# Patient Record
Sex: MALE | Race: WHITE | NOT HISPANIC OR LATINO | Employment: FULL TIME | URBAN - METROPOLITAN AREA
[De-identification: names, ages, dates, MRNs, and addresses within clinical notes are randomized per-mention and may not be internally consistent; named-entity substitution may affect disease eponyms.]

---

## 2023-04-18 ENCOUNTER — TELEPHONE (OUTPATIENT)
Dept: PRIMARY CARE | Facility: CLINIC | Age: 48
End: 2023-04-18
Payer: COMMERCIAL

## 2023-04-18 DIAGNOSIS — K21.9 GASTROESOPHAGEAL REFLUX DISEASE, UNSPECIFIED WHETHER ESOPHAGITIS PRESENT: ICD-10-CM

## 2023-04-18 RX ORDER — OMEPRAZOLE 40 MG/1
40 CAPSULE, DELAYED RELEASE ORAL DAILY
Qty: 90 CAPSULE | Refills: 3 | Status: SHIPPED | OUTPATIENT
Start: 2023-04-18 | End: 2024-03-27 | Stop reason: SDUPTHER

## 2023-04-18 RX ORDER — OMEPRAZOLE 40 MG/1
40 CAPSULE, DELAYED RELEASE ORAL DAILY
COMMUNITY
End: 2023-04-18 | Stop reason: SDUPTHER

## 2023-04-18 NOTE — TELEPHONE ENCOUNTER
Refill:    Omeprazole 40mg 1 capsule daily    Pharmacy: [facility]    Last appointment: 2/21/2023  Future appointment: 7/24/2023

## 2023-07-21 LAB
ALANINE AMINOTRANSFERASE (SGPT) (U/L) IN SER/PLAS: 25 U/L (ref 10–52)
ALBUMIN (G/DL) IN SER/PLAS: 4 G/DL (ref 3.4–5)
ALKALINE PHOSPHATASE (U/L) IN SER/PLAS: 58 U/L (ref 33–120)
ANION GAP IN SER/PLAS: 9 MMOL/L (ref 10–20)
ASPARTATE AMINOTRANSFERASE (SGOT) (U/L) IN SER/PLAS: 22 U/L (ref 9–39)
BILIRUBIN TOTAL (MG/DL) IN SER/PLAS: 0.6 MG/DL (ref 0–1.2)
CALCIDIOL (25 OH VITAMIN D3) (NG/ML) IN SER/PLAS: 30 NG/ML
CALCIUM (MG/DL) IN SER/PLAS: 8.9 MG/DL (ref 8.6–10.3)
CARBON DIOXIDE, TOTAL (MMOL/L) IN SER/PLAS: 29 MMOL/L (ref 21–32)
CHLORIDE (MMOL/L) IN SER/PLAS: 106 MMOL/L (ref 98–107)
CHOLESTEROL (MG/DL) IN SER/PLAS: 203 MG/DL (ref 0–199)
CHOLESTEROL IN HDL (MG/DL) IN SER/PLAS: 42.5 MG/DL
CHOLESTEROL/HDL RATIO: 4.8
COBALAMIN (VITAMIN B12) (PG/ML) IN SER/PLAS: 402 PG/ML (ref 211–911)
CREATININE (MG/DL) IN SER/PLAS: 1.02 MG/DL (ref 0.5–1.3)
ERYTHROCYTE DISTRIBUTION WIDTH (RATIO) BY AUTOMATED COUNT: 12.8 % (ref 11.5–14.5)
ERYTHROCYTE MEAN CORPUSCULAR HEMOGLOBIN CONCENTRATION (G/DL) BY AUTOMATED: 33.4 G/DL (ref 32–36)
ERYTHROCYTE MEAN CORPUSCULAR VOLUME (FL) BY AUTOMATED COUNT: 92 FL (ref 80–100)
ERYTHROCYTES (10*6/UL) IN BLOOD BY AUTOMATED COUNT: 4.67 X10E12/L (ref 4.5–5.9)
GFR MALE: >90 ML/MIN/1.73M2
GLUCOSE (MG/DL) IN SER/PLAS: 88 MG/DL (ref 74–99)
HEMATOCRIT (%) IN BLOOD BY AUTOMATED COUNT: 42.8 % (ref 41–52)
HEMOGLOBIN (G/DL) IN BLOOD: 14.3 G/DL (ref 13.5–17.5)
KEPPRA: 20 UG/ML (ref 10–40)
LDL: 117 MG/DL (ref 0–99)
LEUKOCYTES (10*3/UL) IN BLOOD BY AUTOMATED COUNT: 5.7 X10E9/L (ref 4.4–11.3)
NON HDL CHOLESTEROL: 161 MG/DL
PLATELETS (10*3/UL) IN BLOOD AUTOMATED COUNT: 245 X10E9/L (ref 150–450)
POTASSIUM (MMOL/L) IN SER/PLAS: 4.4 MMOL/L (ref 3.5–5.3)
PROTEIN TOTAL: 6.8 G/DL (ref 6.4–8.2)
SODIUM (MMOL/L) IN SER/PLAS: 140 MMOL/L (ref 136–145)
TRIGLYCERIDE (MG/DL) IN SER/PLAS: 220 MG/DL (ref 0–149)
UREA NITROGEN (MG/DL) IN SER/PLAS: 15 MG/DL (ref 6–23)
VLDL: 44 MG/DL (ref 0–40)

## 2023-07-24 ENCOUNTER — OFFICE VISIT (OUTPATIENT)
Dept: PRIMARY CARE | Facility: CLINIC | Age: 48
End: 2023-07-24
Payer: COMMERCIAL

## 2023-07-24 VITALS
OXYGEN SATURATION: 95 % | HEIGHT: 67 IN | DIASTOLIC BLOOD PRESSURE: 72 MMHG | SYSTOLIC BLOOD PRESSURE: 107 MMHG | WEIGHT: 177 LBS | HEART RATE: 72 BPM | RESPIRATION RATE: 14 BRPM | BODY MASS INDEX: 27.78 KG/M2 | TEMPERATURE: 98.5 F

## 2023-07-24 DIAGNOSIS — K21.9 GASTROESOPHAGEAL REFLUX DISEASE, UNSPECIFIED WHETHER ESOPHAGITIS PRESENT: ICD-10-CM

## 2023-07-24 DIAGNOSIS — G40.801 OTHER EPILEPSY WITH STATUS EPILEPTICUS, NOT INTRACTABLE (MULTI): Primary | ICD-10-CM

## 2023-07-24 DIAGNOSIS — E55.9 VITAMIN D DEFICIENCY: ICD-10-CM

## 2023-07-24 PROBLEM — R55 NEAR SYNCOPE: Status: ACTIVE | Noted: 2023-07-24

## 2023-07-24 PROBLEM — H57.9 EYE PROBLEM: Status: RESOLVED | Noted: 2023-07-24 | Resolved: 2023-07-24

## 2023-07-24 PROBLEM — E78.1 ESSENTIAL HYPERTRIGLYCERIDEMIA: Status: ACTIVE | Noted: 2023-07-24

## 2023-07-24 PROBLEM — G47.33 OBSTRUCTIVE SLEEP APNEA: Status: ACTIVE | Noted: 2023-07-24

## 2023-07-24 PROBLEM — G40.909 EPILEPSY (MULTI): Status: ACTIVE | Noted: 2023-07-24

## 2023-07-24 PROBLEM — G47.10 HYPERSOMNIA: Status: ACTIVE | Noted: 2023-07-24

## 2023-07-24 PROBLEM — S69.91XA INJURY OF RIGHT WRIST: Status: ACTIVE | Noted: 2023-07-24

## 2023-07-24 PROBLEM — L70.9 ADULT ACNE: Status: ACTIVE | Noted: 2023-07-24

## 2023-07-24 PROBLEM — R45.89 DEPRESSED MOOD: Status: ACTIVE | Noted: 2023-07-24

## 2023-07-24 PROBLEM — H57.9 EYE PROBLEM: Status: ACTIVE | Noted: 2023-07-24

## 2023-07-24 PROBLEM — R42 LIGHTHEADEDNESS: Status: ACTIVE | Noted: 2023-07-24

## 2023-07-24 PROBLEM — S69.91XA INJURY OF RIGHT WRIST: Status: RESOLVED | Noted: 2023-07-24 | Resolved: 2023-07-24

## 2023-07-24 PROCEDURE — 99213 OFFICE O/P EST LOW 20 MIN: CPT | Performed by: FAMILY MEDICINE

## 2023-07-24 PROCEDURE — 1036F TOBACCO NON-USER: CPT | Performed by: FAMILY MEDICINE

## 2023-07-24 RX ORDER — CLINDAMYCIN PHOSPHATE 10 UG/ML
LOTION TOPICAL
COMMUNITY
End: 2024-01-02

## 2023-07-24 RX ORDER — MULTIVITAMIN,THER AND MINERALS
1 CAPSULE ORAL DAILY
COMMUNITY
Start: 2022-09-19

## 2023-07-24 RX ORDER — LACOSAMIDE 100 MG/1
100 TABLET ORAL 2 TIMES DAILY
COMMUNITY
End: 2024-01-17 | Stop reason: SDUPTHER

## 2023-07-24 RX ORDER — LEVETIRACETAM 1000 MG/1
1000 TABLET ORAL 2 TIMES DAILY
COMMUNITY
End: 2024-01-17 | Stop reason: SDUPTHER

## 2023-07-24 RX ORDER — BENZOYL PEROXIDE 50 MG/ML
LIQUID TOPICAL
COMMUNITY
Start: 2022-08-14

## 2023-07-24 RX ORDER — LEVETIRACETAM 500 MG/1
250 TABLET ORAL DAILY
COMMUNITY
End: 2024-01-17 | Stop reason: SDUPTHER

## 2023-07-24 RX ORDER — CHOLECALCIFEROL (VITAMIN D3) 25 MCG
2000 TABLET ORAL DAILY
COMMUNITY
Start: 2021-04-20

## 2023-07-24 ASSESSMENT — ENCOUNTER SYMPTOMS
CHILLS: 0
CHEST TIGHTNESS: 0
ARTHRALGIAS: 0
PALPITATIONS: 0
FEVER: 0
ABDOMINAL PAIN: 0
CONFUSION: 0
SHORTNESS OF BREATH: 0

## 2023-07-24 NOTE — PROGRESS NOTES
"Subjective   Patient ID: Lv Cuello is a 47 y.o. male who presents for Follow-up (5 month/).    HPI patient today for follow-up overall doing okay.  Denies any obvious seizure activity since last appointment he continues with medication and follows with neurology.  He does note some early morning fatigue when getting up stating that he is somewhat sluggish till a day get started and he seems to be better throughout the rest of the day.  Review of Systems   Constitutional:  Negative for chills and fever.   HENT:  Negative for congestion and ear pain.    Eyes:  Negative for visual disturbance.   Respiratory:  Negative for chest tightness and shortness of breath.    Cardiovascular:  Negative for chest pain and palpitations.   Gastrointestinal:  Negative for abdominal pain.   Musculoskeletal:  Negative for arthralgias.   Skin:  Negative for pallor.   Psychiatric/Behavioral:  Negative for confusion.        Objective   /72   Pulse 72   Temp 36.9 °C (98.5 °F)   Resp 14   Ht 1.702 m (5' 7\")   Wt 80.3 kg (177 lb)   SpO2 95%   BMI 27.72 kg/m²     Physical Exam  Vitals and nursing note reviewed.   Constitutional:       General: He is not in acute distress.     Appearance: Normal appearance. He is not ill-appearing.   HENT:      Head: Normocephalic and atraumatic.      Right Ear: Tympanic membrane, ear canal and external ear normal.      Left Ear: Tympanic membrane, ear canal and external ear normal.      Mouth/Throat:      Pharynx: Oropharynx is clear.   Eyes:      Extraocular Movements: Extraocular movements intact.   Cardiovascular:      Rate and Rhythm: Normal rate and regular rhythm.      Pulses: Normal pulses.      Heart sounds: Normal heart sounds.   Pulmonary:      Effort: Pulmonary effort is normal.      Breath sounds: Normal breath sounds.   Abdominal:      General: Abdomen is flat. Bowel sounds are normal.      Palpations: Abdomen is soft.      Tenderness: There is no abdominal tenderness. "   Musculoskeletal:         General: Normal range of motion.      Cervical back: Neck supple.   Skin:     General: Skin is warm.   Neurological:      Mental Status: He is alert and oriented to person, place, and time. Mental status is at baseline.   Psychiatric:         Mood and Affect: Mood normal.       Recent Results (from the past 1008 hour(s))   Comprehensive Metabolic Panel    Collection Time: 07/21/23 10:43 AM   Result Value Ref Range    Glucose 88 74 - 99 mg/dL    Sodium 140 136 - 145 mmol/L    Potassium 4.4 3.5 - 5.3 mmol/L    Chloride 106 98 - 107 mmol/L    Bicarbonate 29 21 - 32 mmol/L    Anion Gap 9 (L) 10 - 20 mmol/L    Urea Nitrogen 15 6 - 23 mg/dL    Creatinine 1.02 0.50 - 1.30 mg/dL    GFR MALE >90 >90 mL/min/1.73m2    Calcium 8.9 8.6 - 10.3 mg/dL    Albumin 4.0 3.4 - 5.0 g/dL    Alkaline Phosphatase 58 33 - 120 U/L    Total Protein 6.8 6.4 - 8.2 g/dL    AST 22 9 - 39 U/L    Total Bilirubin 0.6 0.0 - 1.2 mg/dL    ALT (SGPT) 25 10 - 52 U/L   Levetiracetam    Collection Time: 07/21/23 10:43 AM   Result Value Ref Range    KEPPRA 20 10 - 40 ug/mL   Vitamin B12    Collection Time: 07/21/23 10:43 AM   Result Value Ref Range    Vitamin B-12 402 211 - 911 pg/mL   Lipid Panel    Collection Time: 07/21/23 10:43 AM   Result Value Ref Range    Cholesterol 203 (H) 0 - 199 mg/dL    HDL 42.5 mg/dL    Cholesterol/HDL Ratio 4.8      (H) 0 - 99 mg/dL    VLDL 44 (H) 0 - 40 mg/dL    Triglycerides 220 (H) 0 - 149 mg/dL    Non HDL Cholesterol 161 mg/dL   Vitamin D, Total    Collection Time: 07/21/23 10:43 AM   Result Value Ref Range    Vitamin D, 25-Hydroxy 30 ng/mL   CBC    Collection Time: 07/21/23 10:43 AM   Result Value Ref Range    WBC 5.7 4.4 - 11.3 x10E9/L    RBC 4.67 4.50 - 5.90 x10E12/L    Hemoglobin 14.3 13.5 - 17.5 g/dL    Hematocrit 42.8 41.0 - 52.0 %    MCV 92 80 - 100 fL    MCHC 33.4 32.0 - 36.0 g/dL    Platelets 245 150 - 450 x10E9/L    RDW 12.8 11.5 - 14.5 %     Recent labs reviewed with  patient  Continue current medication follow with neurology  Follow low-fat low-cholesterol diet  Return in December of this year with repeat fasting labs    Assessment/Plan   Problem List Items Addressed This Visit       GERD (gastroesophageal reflux disease)     Continue omeprazole dietary modification         Epilepsy (CMS/HCC) - Primary     Currently stable on medication he continues to follow with neurology         Vitamin D deficiency     Continue to monitor and supplement as needed

## 2023-11-02 ENCOUNTER — TELEPHONE (OUTPATIENT)
Dept: PRIMARY CARE | Facility: CLINIC | Age: 48
End: 2023-11-02
Payer: COMMERCIAL

## 2023-12-18 ENCOUNTER — LAB (OUTPATIENT)
Dept: LAB | Facility: LAB | Age: 48
End: 2023-12-18
Payer: COMMERCIAL

## 2023-12-18 DIAGNOSIS — G40.802 OTHER EPILEPSY WITHOUT STATUS EPILEPTICUS, NOT INTRACTABLE (MULTI): ICD-10-CM

## 2023-12-18 DIAGNOSIS — E78.49 OTHER HYPERLIPIDEMIA: ICD-10-CM

## 2023-12-18 DIAGNOSIS — E78.1 ESSENTIAL HYPERTRIGLYCERIDEMIA: Primary | ICD-10-CM

## 2023-12-18 DIAGNOSIS — E78.1 ESSENTIAL HYPERTRIGLYCERIDEMIA: ICD-10-CM

## 2023-12-18 LAB
ALBUMIN SERPL BCP-MCNC: 4 G/DL (ref 3.4–5)
ALP SERPL-CCNC: 80 U/L (ref 33–120)
ALT SERPL W P-5'-P-CCNC: 31 U/L (ref 10–52)
ANION GAP SERPL CALC-SCNC: 11 MMOL/L (ref 10–20)
AST SERPL W P-5'-P-CCNC: 24 U/L (ref 9–39)
BILIRUB SERPL-MCNC: 0.5 MG/DL (ref 0–1.2)
BUN SERPL-MCNC: 11 MG/DL (ref 6–23)
CALCIUM SERPL-MCNC: 8.8 MG/DL (ref 8.6–10.3)
CHLORIDE SERPL-SCNC: 106 MMOL/L (ref 98–107)
CHOLEST SERPL-MCNC: 189 MG/DL (ref 0–199)
CHOLESTEROL/HDL RATIO: 4.8
CO2 SERPL-SCNC: 27 MMOL/L (ref 21–32)
CREAT SERPL-MCNC: 0.99 MG/DL (ref 0.5–1.3)
ERYTHROCYTE [DISTWIDTH] IN BLOOD BY AUTOMATED COUNT: 12.5 % (ref 11.5–14.5)
GFR SERPL CREATININE-BSD FRML MDRD: >90 ML/MIN/1.73M*2
GLUCOSE SERPL-MCNC: 94 MG/DL (ref 74–99)
HCT VFR BLD AUTO: 43.2 % (ref 41–52)
HDLC SERPL-MCNC: 39.6 MG/DL
HGB BLD-MCNC: 14.5 G/DL (ref 13.5–17.5)
LDLC SERPL CALC-MCNC: 115 MG/DL
MCH RBC QN AUTO: 30.3 PG (ref 26–34)
MCHC RBC AUTO-ENTMCNC: 33.6 G/DL (ref 32–36)
MCV RBC AUTO: 90 FL (ref 80–100)
NON HDL CHOLESTEROL: 149 MG/DL (ref 0–149)
NRBC BLD-RTO: 0 /100 WBCS (ref 0–0)
PLATELET # BLD AUTO: 248 X10*3/UL (ref 150–450)
POTASSIUM SERPL-SCNC: 4.3 MMOL/L (ref 3.5–5.3)
PROT SERPL-MCNC: 6.8 G/DL (ref 6.4–8.2)
RBC # BLD AUTO: 4.79 X10*6/UL (ref 4.5–5.9)
SODIUM SERPL-SCNC: 140 MMOL/L (ref 136–145)
TRIGL SERPL-MCNC: 174 MG/DL (ref 0–149)
VLDL: 35 MG/DL (ref 0–40)
WBC # BLD AUTO: 7.8 X10*3/UL (ref 4.4–11.3)

## 2023-12-18 PROCEDURE — 80177 DRUG SCRN QUAN LEVETIRACETAM: CPT

## 2023-12-18 PROCEDURE — 85027 COMPLETE CBC AUTOMATED: CPT

## 2023-12-18 PROCEDURE — 80053 COMPREHEN METABOLIC PANEL: CPT

## 2023-12-18 PROCEDURE — 83721 ASSAY OF BLOOD LIPOPROTEIN: CPT

## 2023-12-18 PROCEDURE — 80061 LIPID PANEL: CPT

## 2023-12-18 PROCEDURE — 36415 COLL VENOUS BLD VENIPUNCTURE: CPT

## 2023-12-19 ENCOUNTER — OFFICE VISIT (OUTPATIENT)
Dept: PRIMARY CARE | Facility: CLINIC | Age: 48
End: 2023-12-19
Payer: COMMERCIAL

## 2023-12-19 VITALS
TEMPERATURE: 97.8 F | OXYGEN SATURATION: 96 % | SYSTOLIC BLOOD PRESSURE: 107 MMHG | WEIGHT: 177.8 LBS | HEART RATE: 77 BPM | BODY MASS INDEX: 27.91 KG/M2 | DIASTOLIC BLOOD PRESSURE: 70 MMHG | HEIGHT: 67 IN

## 2023-12-19 DIAGNOSIS — G47.10 HYPERSOMNIA: ICD-10-CM

## 2023-12-19 DIAGNOSIS — Z13.29 THYROID DISORDER SCREEN: ICD-10-CM

## 2023-12-19 DIAGNOSIS — E55.9 VITAMIN D DEFICIENCY: ICD-10-CM

## 2023-12-19 DIAGNOSIS — E78.1 ESSENTIAL HYPERTRIGLYCERIDEMIA: ICD-10-CM

## 2023-12-19 DIAGNOSIS — E78.49 OTHER HYPERLIPIDEMIA: ICD-10-CM

## 2023-12-19 DIAGNOSIS — K21.9 GASTROESOPHAGEAL REFLUX DISEASE, UNSPECIFIED WHETHER ESOPHAGITIS PRESENT: ICD-10-CM

## 2023-12-19 DIAGNOSIS — G40.409 OTHER GENERALIZED EPILEPSY, NOT INTRACTABLE, WITHOUT STATUS EPILEPTICUS (MULTI): Primary | ICD-10-CM

## 2023-12-19 LAB
LDLC SERPL DIRECT ASSAY-MCNC: 125 MG/DL (ref 0–129)
LEVETIRACETAM SERPL-MCNC: 20 UG/ML (ref 10–40)

## 2023-12-19 PROCEDURE — 99214 OFFICE O/P EST MOD 30 MIN: CPT | Performed by: FAMILY MEDICINE

## 2023-12-19 PROCEDURE — 1036F TOBACCO NON-USER: CPT | Performed by: FAMILY MEDICINE

## 2023-12-19 ASSESSMENT — ENCOUNTER SYMPTOMS
CHILLS: 0
SHORTNESS OF BREATH: 0
PALPITATIONS: 0
FEVER: 0
CHEST TIGHTNESS: 0
CONFUSION: 0
ABDOMINAL PAIN: 0
DEPRESSION: 0
ARTHRALGIAS: 0

## 2023-12-19 NOTE — ASSESSMENT & PLAN NOTE
Reviewed better sleep hygiene.  He is can try taking over-the-counter melatonin.  We discussed doing a sleep study for now he wants to think about it see how the next month goes if he changes his mind he will call the office for an order.

## 2023-12-19 NOTE — PROGRESS NOTES
"Subjective   Patient ID: Lv Cuello is a 48 y.o. male who presents for Follow-up (5 months follow up. Patient states feeling under the weather for the past week, he feels stuffiness, headaches, and phlegm. ).    HPI patient today for follow-up.  For the most part states he is doing okay he did not denies any known recent seizure activity.  He does state that he does not always feel rested and ready to go first thing in the morning.  He is not sure if it is because he is not getting a good night sleep or not.  He denies really feeling depressed.    Review of Systems   Constitutional:  Negative for chills and fever.   HENT:  Negative for congestion and ear pain.    Eyes:  Negative for visual disturbance.   Respiratory:  Negative for chest tightness and shortness of breath.    Cardiovascular:  Negative for chest pain and palpitations.   Gastrointestinal:  Negative for abdominal pain.   Musculoskeletal:  Negative for arthralgias.   Skin:  Negative for pallor.   Psychiatric/Behavioral:  Negative for confusion.        Objective   /70 (BP Location: Left arm, Patient Position: Sitting, BP Cuff Size: Adult long)   Pulse 77   Temp 36.6 °C (97.8 °F) (Temporal)   Ht 1.702 m (5' 7\")   Wt 80.6 kg (177 lb 12.8 oz)   SpO2 96%   BMI 27.85 kg/m²     Physical Exam  Vitals and nursing note reviewed.   Constitutional:       General: He is not in acute distress.     Appearance: Normal appearance. He is not ill-appearing.   HENT:      Head: Normocephalic and atraumatic.      Right Ear: Tympanic membrane, ear canal and external ear normal.      Left Ear: Tympanic membrane, ear canal and external ear normal.      Mouth/Throat:      Pharynx: Oropharynx is clear.   Eyes:      Extraocular Movements: Extraocular movements intact.   Cardiovascular:      Rate and Rhythm: Normal rate and regular rhythm.      Pulses: Normal pulses.      Heart sounds: Normal heart sounds.   Pulmonary:      Effort: Pulmonary effort is normal.      " Breath sounds: Normal breath sounds.   Abdominal:      General: Abdomen is flat. Bowel sounds are normal.      Palpations: Abdomen is soft.      Tenderness: There is no abdominal tenderness.   Musculoskeletal:         General: Normal range of motion.      Cervical back: Neck supple.   Skin:     General: Skin is warm.   Neurological:      Mental Status: He is alert and oriented to person, place, and time. Mental status is at baseline.   Psychiatric:         Mood and Affect: Mood normal.       Recent Results (from the past 1008 hour(s))   CBC    Collection Time: 12/18/23 11:59 AM   Result Value Ref Range    WBC 7.8 4.4 - 11.3 x10*3/uL    nRBC 0.0 0.0 - 0.0 /100 WBCs    RBC 4.79 4.50 - 5.90 x10*6/uL    Hemoglobin 14.5 13.5 - 17.5 g/dL    Hematocrit 43.2 41.0 - 52.0 %    MCV 90 80 - 100 fL    MCH 30.3 26.0 - 34.0 pg    MCHC 33.6 32.0 - 36.0 g/dL    RDW 12.5 11.5 - 14.5 %    Platelets 248 150 - 450 x10*3/uL   Cholesterol, LDL Direct    Collection Time: 12/18/23 11:59 AM   Result Value Ref Range    LDL, Direct 125 0 - 129 mg/dL   Comprehensive Metabolic Panel    Collection Time: 12/18/23 11:59 AM   Result Value Ref Range    Glucose 94 74 - 99 mg/dL    Sodium 140 136 - 145 mmol/L    Potassium 4.3 3.5 - 5.3 mmol/L    Chloride 106 98 - 107 mmol/L    Bicarbonate 27 21 - 32 mmol/L    Anion Gap 11 10 - 20 mmol/L    Urea Nitrogen 11 6 - 23 mg/dL    Creatinine 0.99 0.50 - 1.30 mg/dL    eGFR >90 >60 mL/min/1.73m*2    Calcium 8.8 8.6 - 10.3 mg/dL    Albumin 4.0 3.4 - 5.0 g/dL    Alkaline Phosphatase 80 33 - 120 U/L    Total Protein 6.8 6.4 - 8.2 g/dL    AST 24 9 - 39 U/L    Bilirubin, Total 0.5 0.0 - 1.2 mg/dL    ALT 31 10 - 52 U/L   Lipid Panel    Collection Time: 12/18/23 11:59 AM   Result Value Ref Range    Cholesterol 189 0 - 199 mg/dL    HDL-Cholesterol 39.6 mg/dL    Cholesterol/HDL Ratio 4.8     LDL Calculated 115 (H) <=99 mg/dL    VLDL 35 0 - 40 mg/dL    Triglycerides 174 (H) 0 - 149 mg/dL    Non HDL Cholesterol 149 0 - 149  mg/dL   Levetiracetam    Collection Time: 12/18/23 11:59 AM   Result Value Ref Range    Keppra 20 10 - 40 ug/mL     Recent labs reviewed with patient overall numbers are stable and have improved continue to work on lifestyle modifications.    He refuses flu vaccine    Regarding his morning fatigue we discussed sleep study he is declined if he changes his mind he will notify the office.  In the meantime he can work on better sleep hygiene practices and also may try taking some melatonin.    He is return to our office in 5 months with repeat fasting lab    Assessment/Plan   Problem List Items Addressed This Visit             ICD-10-CM    GERD (gastroesophageal reflux disease) K21.9     Stable continue omeprazole 40 mg daily         Relevant Orders    CBC    Comprehensive Metabolic Panel    Follow Up In Primary Care - Established    Epilepsy (CMS/[facility]) - Primary G40.909     Clinically stable continue current medications continue to follow with neurology.  Keppra level is in therapeutic range         Relevant Orders    CBC    Comprehensive Metabolic Panel    Levetiracetam    Essential hypertriglyceridemia E78.1     Triglycerides have improved continue to work on lifestyle modifications         Relevant Orders    Comprehensive Metabolic Panel    Lipid Panel    Follow Up In Primary Care - Established    Hypersomnia G47.10     Reviewed better sleep hygiene.  He is can try taking over-the-counter melatonin.  We discussed doing a sleep study for now he wants to think about it see how the next month goes if he changes his mind he will call the office for an order.         Vitamin D deficiency E55.9     Continue to monitor supplement as needed         Relevant Orders    Comprehensive Metabolic Panel    Vitamin D 25-Hydroxy,Total (for eval of Vitamin D levels)    Follow Up In Primary Care - Established    Thyroid disorder screen Z13.29     TSH with reflex         Relevant Orders    TSH with reflex to Free T4 if abnormal      Other Visit Diagnoses         Codes    Other hyperlipidemia     E78.49    Relevant Orders    Cholesterol, LDL Direct

## 2023-12-19 NOTE — ASSESSMENT & PLAN NOTE
Clinically stable continue current medications continue to follow with neurology.  Keppra level is in therapeutic range

## 2023-12-27 DIAGNOSIS — L73.9 FOLLICULITIS: Primary | ICD-10-CM

## 2024-01-02 RX ORDER — CLINDAMYCIN PHOSPHATE 10 UG/ML
LOTION TOPICAL
Qty: 60 ML | Refills: 11 | Status: SHIPPED | OUTPATIENT
Start: 2024-01-02

## 2024-01-16 ENCOUNTER — TELEMEDICINE (OUTPATIENT)
Dept: NEUROLOGY | Facility: CLINIC | Age: 49
End: 2024-01-16
Payer: COMMERCIAL

## 2024-01-16 DIAGNOSIS — R56.9 SEIZURE (MULTI): Primary | ICD-10-CM

## 2024-01-16 PROCEDURE — 99214 OFFICE O/P EST MOD 30 MIN: CPT | Mod: GT,95 | Performed by: NURSE PRACTITIONER

## 2024-01-16 PROCEDURE — 99214 OFFICE O/P EST MOD 30 MIN: CPT | Performed by: NURSE PRACTITIONER

## 2024-01-16 NOTE — LETTER
January 16, 2024     Patient: Lv Cuello   YOB: 1975   Date of Visit: 1/16/2024       To Whom It May Concern:    It is my medical opinion that Lv Cuello may return to full duty immediately with no restrictions.    If you have any questions or concerns, please don't hesitate to call.         Sincerely,    Ritika Parker, APRN-CNP    CC: No Recipients

## 2024-01-17 RX ORDER — LACOSAMIDE 100 MG/1
100 TABLET ORAL 2 TIMES DAILY
Qty: 60 TABLET | Refills: 5 | Status: SHIPPED | OUTPATIENT
Start: 2024-01-17 | End: 2024-06-04

## 2024-01-17 RX ORDER — LEVETIRACETAM 500 MG/1
250 TABLET ORAL DAILY
Qty: 15 TABLET | Refills: 11 | Status: SHIPPED | OUTPATIENT
Start: 2024-01-17 | End: 2025-01-16

## 2024-01-17 RX ORDER — LEVETIRACETAM 1000 MG/1
1000 TABLET ORAL 2 TIMES DAILY
Qty: 60 TABLET | Refills: 11 | Status: SHIPPED | OUTPATIENT
Start: 2024-01-17 | End: 2025-01-16

## 2024-01-17 NOTE — PROGRESS NOTES
Virtual or Telephone Consent    A telephone visit (audio only) between the patient (at the originating site) and the provider (at the distant site) was utilized to provide this telehealth service.   Verbal consent was requested and obtained from Lv Cuello on this date, 01/17/24 for a telehealth visit.      Patient ID: Lv Cuello 48 y.o.male presenting in follow-up for epilepsy.     HPI  --------------- Classification ---------------  EPILEPTIC Paroxysmal Episodes  Semiology:  1. Dialeptic* > Generalized clonic seizure**  - Onset: September 2019 (or as early as 6/2019)  - Frequency: 2x in lifetime during day, every few months at night  - Last seizure: Mid-4/2022 (suspected; woke up sore, confused)  EZ: Unknown  Etiology: Unknown  Comorbidities: N/A     Prior AEDs: N/A  Current AEDs (last level): LEV 1651-7480, -100    SEIZURE HISTORY:  The patient had a history of seizures since childhood. At age 1-1yo, the patient had some seizures of unknown type. Reports that he had been told by his mother that he had seizures as a child, but that no treatment was needed. Patient denies any childhood medications, no recollection of seizure disorder.     The patient's first adult episode occurred around June 2019. Per report of his girlfriend and EMR, the patient had woken up and was getting ready for work in the bathroom. She heard a noise (as if he'd fallen), but didn't pay much attention to it. When he emerged from the bathroom, she attempted to speak to him but he was not acting himself. He was breathing funny, but still seemed a little out of it. He soon fell asleep afterwards, and slept for roughly 30 minutes. On awakening, he was back to himself, though had no recollection of the event. However, he did complain that his tongue hurt. Since he was responsive, they did not pursue medical assessment.     However, he had another event on 9/19/2019. He had been in his normal self, got up in the morning to go  to work. Per report of girlfriend, he had gone to the bathroom, and she heard a yell. She turned around and ran to him, and saw all of his muscles were very tight and he was flush. He then started having generalized convulsions, and had purple lips. This lasted a few minutes. He was drowsy and out of it for another 30 minutes. Denies incontinence. Did have tongue and cheek biting (tip of tongue and sides). EMS was called, and he was described to be post-ictal on arrival, drowsy and out of it. Upon presentation to [facility] ED, however, he was back to baseline. Exam was unremarkable, though labs were significant for elevated prolactin (32.17). CT head was unremarkable     He has also complained of some headaches, which he describes as mild and bitemporal pressure that comes and goes, and is generally responsive to over the counter naproxen. This is usually accompanied by lightheadedness, and at times a vertiginous feeling. He continues to fatigue fairly quickly, and needs to take a nap.     9/30/2019: Initial presentation as outlined above. Started on levetiracetam based on clinical concern for epilepsy. Routine EEG and MRI brain ordered.      11/7/2019: MRI brain w/wo gadolinium (Epilepsy protocol) was normal.     11/25/2019: Routine EEG was normal. No epileptiform discharges or lateralizing signs.     12/3/2019: Called  Epilepsy office for breakthrough seizure. He was not aware of the event (though remembers sitting up, but nothing afterwards), but his girlfriend told him he'd been sleeping and had roughly 20-30 seconds of generalized shaking. Denied incontinence or tongue biting. Patient himself stated he'd been feeling well, though has been having restless sleep due to the stressors in his life (working on scheduling appointment with Psych in [place]), and had missed a single dose of his levetiracetam a few days prior to the event, though had otherwise been fully adherent. The event itself was self-limited, though he  was tired and slept a good portion into the day. Levetiracetam was increased to 750mg BID, and he was instructed to continue to f/u with Psych.      1/21/2020: The patient has been overall doing very well. He states he has been seizure free, and has been tolerating the increased dose of levetiracetam, even better than the lower dose. -750 continued.     5/18/20: Doing well, no issues. Adherent to -750 without side effects. Not driving. Noted improvement in LEV. Did have cardiology workup noting mild PFO. Continued on -750.      6/29/2020: Called with breakthrough seizure in settin of poor sleep. Had GTC out of sleep. LEV level 29. Increased LEV to 4552-8103.     11/5/2020: Called to report irritability, favored due to LEV. Discussed trialing pyridoxine 100mg daily.     11/18/2021: Called to report breakthrough seizure, again out of sleep. +TB, incontinence. LEV increased to 4406-6897.      5/2/2022: Continues to have seizures, every 1-2 months. Exclusively out of sleep, early in morning. Fiance notes grunting, spitting, version (usually to left), stiff, then convulsing. Seizures lasting 5 minutes. Also has smaller seizure. Self reduced LEV 9957-5540 to 7416-2248. Started -100, referred to [facility].       PRESENT CONCERNS:    Patient states he's been doing well, with no further seizures since mid-April 2022 (woke up sore and confused) . and his fiancee has denied any seizure-like episodes at night as well.   He has concern for seizure in fall of 2023 because he woke up feeling a bit off balance- this can be typical after a seizure but he didn't have other associated symptoms like he normally does - I.e tongue bit or loss of bladder control -- also wife did not wake up and she typically always wakes during seizure.   He has taking and tolerating the medications well and without issue. He has no additional concerns.     Review of Systems   All other systems reviewed and are  negative.        CONTROLLED SUBSTANCE  OARRS:  No data recorded  I have personally reviewed the OARRS report for Rafiq Montana. I have considered the risks of abuse, dependence, addiction and diversion and I believe that it is clinically appropriate for Rafiq Montana to be prescribed this medication    Is the patient prescribed a combination of a benzodiazepine and opioid?  No    Last Urine Drug Screen / ordered today: No  No results found for this or any previous visit (from the past 8760 hour(s)).    Clinical rationale for not completing a Urine Drug Screen: Patient prescribed controlled substance for management of seizure, epilepsy, movement disorder              RESULTS:  EEG:  No EEG results found for the past 12 months    EKG:  No results found for this or any previous visit (from the past 4464 hour(s)).    LABS:      IMAGING:  No MRI head results found for the past 12 months    No CT head results found for the past 12 months    Results for orders placed or performed in visit on 11/25/19   EEG    Narrative    Ordered by an unspecified provider.   Results for orders placed or performed in visit on 11/07/19   MR BRAIN W AND WO IV CONTRAST    Narrative    MRN: [id number]  Patient Name: RAFIQ MONTANA     STUDY:  MRI BRAIN W/WO CONTRAST; 11/7/2019 10:25 am     INDICATION:  Epilepsy, unspecified, not intractable, without status epilepticus.     COMPARISON:  09/19/2019 head CT.     ACCESSION NUMBER(S):  [id number]     ORDERING CLINICIAN:  SINCERE ABAD     TECHNIQUE:  Axial T2, FLAIR, DWI, gradient echo T2 and sagittal and coronal T1  weighted images of brain were acquired. Post contrast T1 weighted  images were acquired after administration of  gadolinium based  intravenous contrast.     FINDINGS:  No heterotopic gray matter was identified. No definite asymmetry or  other abnormality of the mesial temporal lobes within the limits of  this exam without dedicated thin section imaging through the  mesial  temporal regions. No acute intracranial hemorrhage, mass mass effect  or midline shift. No pathologic parenchymal enhancement. No  hydrocephalus. Minimal nonspecific periventricular T2 FLAIR  hyperintensities may reflect subtle chronic small vessel ischemic  changes given the age of the patient. The major intracranial flow  voids are patent. Minimal mucoperiosteal thickening throughout the  ethmoid sinuses and maxillary sinuses noted. Anatomic variant metopic  suture. Mastoid air cells are clear.     IMPRESSION:  No intracranial abnormality was identified on this examination to  explain the clinical complaint of seizure.       Vitals:  There were no vitals filed for this visit.    PHYSICAL EXAM:  Neurologic Exam       ASSESSMENT & PLAN:   48 y.o. male presenting in follow-up for peviously diagnosed epilepsy. Semiology as described above    Problem List Items Addressed This Visit    None       48 year old man presenting in follow-up of his previously diagnosed epilepsy. His last known seizure was mid-April 2022 (suspected, woke up sore and confused). Discussed concern for possible seizure in fall of 2023 but it did not have his typical features and wife was not awoken. Will not increase meds at this time- if another event were to occur please let the office know. Doing well since initiation of -100, taking and tolerating without issue. Continues LEV 7178-1073 as well. Does complain of waking not feeling refreshed and feeling groggy.      - Continue LEV 3824-5288, -100  - Seizure precautions, SUDEP risk, and medication side effects were explained in detail (see pt summary)  -reccomned sleep study for AURELIANO   -would like to pursue EMU to localize his epilepsy - he will discuss with wife and let me know   - Discussed need to call me with breakthrough events, or go to ED with any acute/concerning symptoms  - Will see back in clinic in 6 months; provided number to call with questions     No  restrictions at this time

## 2024-02-04 DIAGNOSIS — K21.9 GASTROESOPHAGEAL REFLUX DISEASE, UNSPECIFIED WHETHER ESOPHAGITIS PRESENT: ICD-10-CM

## 2024-03-22 DIAGNOSIS — K21.9 GASTROESOPHAGEAL REFLUX DISEASE, UNSPECIFIED WHETHER ESOPHAGITIS PRESENT: ICD-10-CM

## 2024-03-22 RX ORDER — OMEPRAZOLE 40 MG/1
40 CAPSULE, DELAYED RELEASE ORAL DAILY
Qty: 90 CAPSULE | Refills: 3 | OUTPATIENT
Start: 2024-03-22

## 2024-03-27 ENCOUNTER — TELEPHONE (OUTPATIENT)
Dept: PRIMARY CARE | Facility: CLINIC | Age: 49
End: 2024-03-27
Payer: COMMERCIAL

## 2024-03-27 DIAGNOSIS — K21.9 GASTROESOPHAGEAL REFLUX DISEASE, UNSPECIFIED WHETHER ESOPHAGITIS PRESENT: ICD-10-CM

## 2024-03-27 RX ORDER — OMEPRAZOLE 40 MG/1
40 CAPSULE, DELAYED RELEASE ORAL DAILY
Qty: 90 CAPSULE | Refills: 3 | Status: SHIPPED | OUTPATIENT
Start: 2024-03-27

## 2024-03-27 RX ORDER — OMEPRAZOLE 40 MG/1
40 CAPSULE, DELAYED RELEASE ORAL DAILY
Qty: 90 CAPSULE | Refills: 3 | OUTPATIENT
Start: 2024-03-27

## 2024-03-27 NOTE — TELEPHONE ENCOUNTER
Rx Refill Request Telephone Encounter    Name:  Lv DUKES Cuello  :  [id number]  Medication Name:                    Specific Pharmacy location:  [facility] in [place]  Date of last appointment:  2023  Date of next appointment:  2024  Best number to reach patient:  [phone]

## 2024-05-21 ENCOUNTER — LAB (OUTPATIENT)
Dept: LAB | Facility: LAB | Age: 49
End: 2024-05-21
Payer: COMMERCIAL

## 2024-05-21 DIAGNOSIS — E78.1 ESSENTIAL HYPERTRIGLYCERIDEMIA: ICD-10-CM

## 2024-05-21 DIAGNOSIS — K21.9 GASTROESOPHAGEAL REFLUX DISEASE, UNSPECIFIED WHETHER ESOPHAGITIS PRESENT: ICD-10-CM

## 2024-05-21 DIAGNOSIS — E55.9 VITAMIN D DEFICIENCY: ICD-10-CM

## 2024-05-21 DIAGNOSIS — E78.49 OTHER HYPERLIPIDEMIA: ICD-10-CM

## 2024-05-21 DIAGNOSIS — G40.409 OTHER GENERALIZED EPILEPSY, NOT INTRACTABLE, WITHOUT STATUS EPILEPTICUS (MULTI): ICD-10-CM

## 2024-05-21 DIAGNOSIS — Z13.29 THYROID DISORDER SCREEN: ICD-10-CM

## 2024-05-21 LAB
25(OH)D3 SERPL-MCNC: 24 NG/ML (ref 30–100)
ALBUMIN SERPL BCP-MCNC: 4 G/DL (ref 3.4–5)
ALP SERPL-CCNC: 74 U/L (ref 33–120)
ALT SERPL W P-5'-P-CCNC: 23 U/L (ref 10–52)
ANION GAP SERPL CALC-SCNC: 9 MMOL/L
AST SERPL W P-5'-P-CCNC: 19 U/L (ref 9–39)
BILIRUB SERPL-MCNC: 0.5 MG/DL (ref 0–1.2)
BUN SERPL-MCNC: 11 MG/DL (ref 6–23)
CALCIUM SERPL-MCNC: 8.6 MG/DL (ref 8.6–10.3)
CHLORIDE SERPL-SCNC: 106 MMOL/L (ref 98–107)
CHOLEST SERPL-MCNC: 198 MG/DL (ref 0–199)
CHOLESTEROL/HDL RATIO: 5.3
CO2 SERPL-SCNC: 28 MMOL/L (ref 21–32)
CREAT SERPL-MCNC: 1.01 MG/DL (ref 0.5–1.3)
EGFRCR SERPLBLD CKD-EPI 2021: >90 ML/MIN/1.73M*2
ERYTHROCYTE [DISTWIDTH] IN BLOOD BY AUTOMATED COUNT: 12.7 % (ref 11.5–14.5)
GLUCOSE SERPL-MCNC: 93 MG/DL (ref 74–99)
HCT VFR BLD AUTO: 44 % (ref 41–52)
HDLC SERPL-MCNC: 37.7 MG/DL
HGB BLD-MCNC: 14.8 G/DL (ref 13.5–17.5)
LDLC SERPL CALC-MCNC: 108 MG/DL
MCH RBC QN AUTO: 30.5 PG (ref 26–34)
MCHC RBC AUTO-ENTMCNC: 33.6 G/DL (ref 32–36)
MCV RBC AUTO: 91 FL (ref 80–100)
NON HDL CHOLESTEROL: 160 MG/DL (ref 0–149)
NRBC BLD-RTO: 0 /100 WBCS (ref 0–0)
PLATELET # BLD AUTO: 245 X10*3/UL (ref 150–450)
POTASSIUM SERPL-SCNC: 4.2 MMOL/L (ref 3.5–5.3)
PROT SERPL-MCNC: 6.6 G/DL (ref 6.4–8.2)
RBC # BLD AUTO: 4.85 X10*6/UL (ref 4.5–5.9)
SODIUM SERPL-SCNC: 139 MMOL/L (ref 136–145)
TRIGL SERPL-MCNC: 264 MG/DL (ref 0–149)
TSH SERPL-ACNC: 2.92 MIU/L (ref 0.44–3.98)
VLDL: 53 MG/DL (ref 0–40)
WBC # BLD AUTO: 5.3 X10*3/UL (ref 4.4–11.3)

## 2024-05-21 PROCEDURE — 80053 COMPREHEN METABOLIC PANEL: CPT

## 2024-05-21 PROCEDURE — 83721 ASSAY OF BLOOD LIPOPROTEIN: CPT

## 2024-05-21 PROCEDURE — 84443 ASSAY THYROID STIM HORMONE: CPT

## 2024-05-21 PROCEDURE — 85027 COMPLETE CBC AUTOMATED: CPT

## 2024-05-21 PROCEDURE — 82306 VITAMIN D 25 HYDROXY: CPT

## 2024-05-21 PROCEDURE — 36415 COLL VENOUS BLD VENIPUNCTURE: CPT

## 2024-05-21 PROCEDURE — 80061 LIPID PANEL: CPT

## 2024-05-22 ENCOUNTER — OFFICE VISIT (OUTPATIENT)
Dept: PRIMARY CARE | Facility: CLINIC | Age: 49
End: 2024-05-22
Payer: COMMERCIAL

## 2024-05-22 ENCOUNTER — TELEPHONE (OUTPATIENT)
Dept: PRIMARY CARE | Facility: CLINIC | Age: 49
End: 2024-05-22

## 2024-05-22 VITALS
RESPIRATION RATE: 14 BRPM | SYSTOLIC BLOOD PRESSURE: 111 MMHG | WEIGHT: 173 LBS | BODY MASS INDEX: 27.15 KG/M2 | OXYGEN SATURATION: 95 % | HEIGHT: 67 IN | DIASTOLIC BLOOD PRESSURE: 74 MMHG | HEART RATE: 77 BPM | TEMPERATURE: 96.5 F

## 2024-05-22 DIAGNOSIS — G40.401 OTHER GENERALIZED EPILEPSY, NOT INTRACTABLE, WITH STATUS EPILEPTICUS (MULTI): Primary | ICD-10-CM

## 2024-05-22 DIAGNOSIS — E55.9 VITAMIN D DEFICIENCY: ICD-10-CM

## 2024-05-22 DIAGNOSIS — E78.1 ESSENTIAL HYPERTRIGLYCERIDEMIA: ICD-10-CM

## 2024-05-22 DIAGNOSIS — K21.9 GASTROESOPHAGEAL REFLUX DISEASE, UNSPECIFIED WHETHER ESOPHAGITIS PRESENT: ICD-10-CM

## 2024-05-22 PROBLEM — R45.89 DEPRESSED MOOD: Status: RESOLVED | Noted: 2023-07-24 | Resolved: 2024-05-22

## 2024-05-22 LAB — LDLC SERPL DIRECT ASSAY-MCNC: 126 MG/DL (ref 0–129)

## 2024-05-22 PROCEDURE — 1036F TOBACCO NON-USER: CPT | Performed by: FAMILY MEDICINE

## 2024-05-22 PROCEDURE — 99213 OFFICE O/P EST LOW 20 MIN: CPT | Performed by: FAMILY MEDICINE

## 2024-05-22 ASSESSMENT — ENCOUNTER SYMPTOMS
CHILLS: 0
ARTHRALGIAS: 0
FEVER: 0
SHORTNESS OF BREATH: 0
CONFUSION: 0
CHEST TIGHTNESS: 0
ABDOMINAL PAIN: 0
PALPITATIONS: 0

## 2024-05-22 NOTE — ASSESSMENT & PLAN NOTE
Stable no recent seizure activity reported.  Continue Keppra keep in touch with neurology.  Will recheck Keppra level with next lab draw this fall.  On recent blood work lab shayna did not draw the right tube.

## 2024-05-22 NOTE — TELEPHONE ENCOUNTER
Valeria from lab services called and reported a cancelled lab. She said that the Levetiracetam lab was cancelled due to the wrong container being used.

## 2024-05-22 NOTE — PROGRESS NOTES
"Subjective   Patient ID: Lv Cuello is a 48 y.o. male who presents for Follow-up (5 month).    HPI patient today for follow-up of ongoing healthcare issues review of recent lab work for most part states he is doing well has been trying to eat healthier.  No obvious seizure activity reported.    Review of Systems   Constitutional:  Negative for chills and fever.   HENT:  Negative for congestion and ear pain.    Eyes:  Negative for visual disturbance.   Respiratory:  Negative for chest tightness and shortness of breath.    Cardiovascular:  Negative for chest pain and palpitations.   Gastrointestinal:  Negative for abdominal pain.   Musculoskeletal:  Negative for arthralgias.   Skin:  Negative for pallor.   Psychiatric/Behavioral:  Negative for confusion.        Objective   /74   Pulse 77   Temp 35.8 °C (96.5 °F)   Resp 14   Ht 1.702 m (5' 7\")   Wt 78.5 kg (173 lb)   SpO2 95%   BMI 27.10 kg/m²     Physical Exam  Vitals and nursing note reviewed.   Constitutional:       General: He is not in acute distress.     Appearance: Normal appearance. He is not ill-appearing.   HENT:      Head: Normocephalic and atraumatic.      Right Ear: Tympanic membrane, ear canal and external ear normal.      Left Ear: Tympanic membrane, ear canal and external ear normal.      Mouth/Throat:      Pharynx: Oropharynx is clear.   Eyes:      Extraocular Movements: Extraocular movements intact.   Cardiovascular:      Rate and Rhythm: Normal rate and regular rhythm.      Pulses: Normal pulses.      Heart sounds: Normal heart sounds.   Pulmonary:      Effort: Pulmonary effort is normal.      Breath sounds: Normal breath sounds.   Abdominal:      General: Abdomen is flat. Bowel sounds are normal.      Palpations: Abdomen is soft.      Tenderness: There is no abdominal tenderness.   Musculoskeletal:         General: Normal range of motion.      Cervical back: Neck supple.   Skin:     General: Skin is warm.   Neurological:      " Mental Status: He is alert and oriented to person, place, and time. Mental status is at baseline.   Psychiatric:         Mood and Affect: Mood normal.       Recent Results (from the past 1008 hour(s))   CBC    Collection Time: 05/21/24  1:14 PM   Result Value Ref Range    WBC 5.3 4.4 - 11.3 x10*3/uL    nRBC 0.0 0.0 - 0.0 /100 WBCs    RBC 4.85 4.50 - 5.90 x10*6/uL    Hemoglobin 14.8 13.5 - 17.5 g/dL    Hematocrit 44.0 41.0 - 52.0 %    MCV 91 80 - 100 fL    MCH 30.5 26.0 - 34.0 pg    MCHC 33.6 32.0 - 36.0 g/dL    RDW 12.7 11.5 - 14.5 %    Platelets 245 150 - 450 x10*3/uL   Comprehensive Metabolic Panel    Collection Time: 05/21/24  1:14 PM   Result Value Ref Range    Glucose 93 74 - 99 mg/dL    Sodium 139 136 - 145 mmol/L    Potassium 4.2 3.5 - 5.3 mmol/L    Chloride 106 98 - 107 mmol/L    Bicarbonate 28 21 - 32 mmol/L    Anion Gap 9 mmol/L    Urea Nitrogen 11 6 - 23 mg/dL    Creatinine 1.01 0.50 - 1.30 mg/dL    eGFR >90 >60 mL/min/1.73m*2    Calcium 8.6 8.6 - 10.3 mg/dL    Albumin 4.0 3.4 - 5.0 g/dL    Alkaline Phosphatase 74 33 - 120 U/L    Total Protein 6.6 6.4 - 8.2 g/dL    AST 19 9 - 39 U/L    Bilirubin, Total 0.5 0.0 - 1.2 mg/dL    ALT 23 10 - 52 U/L   Lipid Panel    Collection Time: 05/21/24  1:14 PM   Result Value Ref Range    Cholesterol 198 0 - 199 mg/dL    HDL-Cholesterol 37.7 mg/dL    Cholesterol/HDL Ratio 5.3     LDL Calculated 108 (H) <=99 mg/dL    VLDL 53 (H) 0 - 40 mg/dL    Triglycerides 264 (H) 0 - 149 mg/dL    Non HDL Cholesterol 160 (H) 0 - 149 mg/dL   Vitamin D 25-Hydroxy,Total (for eval of Vitamin D levels)    Collection Time: 05/21/24  1:14 PM   Result Value Ref Range    Vitamin D, 25-Hydroxy, Total 24 (L) 30 - 100 ng/mL   TSH with reflex to Free T4 if abnormal    Collection Time: 05/21/24  1:14 PM   Result Value Ref Range    Thyroid Stimulating Hormone 2.92 0.44 - 3.98 mIU/L   Cholesterol, LDL Direct    Collection Time: 05/21/24  1:14 PM   Result Value Ref Range    LDL, Direct 126 0 - 129 mg/dL      Recent labs reviewed with patient    Low-fat low-cholesterol diet exercise as try to improve triglycerides as well as HDL.  Continue current medications    Continue to follow with neurology    Return to our office 5 months with repeat fasting lab work      Assessment/Plan   Problem List Items Addressed This Visit             ICD-10-CM    GERD (gastroesophageal reflux disease) K21.9     Stable continue omeprazole dietary modification         Relevant Orders    CBC    Comprehensive Metabolic Panel    Follow Up In Primary Care - Established    Epilepsy (Multi) - Primary G40.909     Stable no recent seizure activity reported.  Continue Keppra keep in touch with neurology.  Will recheck Keppra level with next lab draw this fall.  On recent blood work lab shayna did not draw the right tube.           Relevant Orders    CBC    Levetiracetam    Follow Up In Primary Care - Established    Essential hypertriglyceridemia E78.1     Reviewed dietary modifications exercise continue to monitor         Relevant Orders    Comprehensive Metabolic Panel    Lipid Panel    Follow Up In Primary Care - Established    Vitamin D deficiency E55.9     Increase vitamin D3 to 2000 to 5000 units daily         Relevant Orders    Comprehensive Metabolic Panel    Vitamin D 25-Hydroxy,Total (for eval of Vitamin D levels)    Follow Up In Primary Care - Established

## 2024-06-03 DIAGNOSIS — R56.9 SEIZURE (MULTI): ICD-10-CM

## 2024-06-04 RX ORDER — LACOSAMIDE 100 MG/1
100 TABLET ORAL 2 TIMES DAILY
Qty: 60 TABLET | Refills: 5 | Status: SHIPPED | OUTPATIENT
Start: 2024-06-04

## 2024-10-22 ENCOUNTER — LAB (OUTPATIENT)
Dept: LAB | Facility: LAB | Age: 49
End: 2024-10-22
Payer: COMMERCIAL

## 2024-10-22 DIAGNOSIS — E78.1 ESSENTIAL HYPERTRIGLYCERIDEMIA: ICD-10-CM

## 2024-10-22 DIAGNOSIS — G40.401 OTHER GENERALIZED EPILEPSY, NOT INTRACTABLE, WITH STATUS EPILEPTICUS: ICD-10-CM

## 2024-10-22 DIAGNOSIS — K21.9 GASTROESOPHAGEAL REFLUX DISEASE, UNSPECIFIED WHETHER ESOPHAGITIS PRESENT: ICD-10-CM

## 2024-10-22 DIAGNOSIS — E55.9 VITAMIN D DEFICIENCY: ICD-10-CM

## 2024-10-22 LAB
25(OH)D3 SERPL-MCNC: 37 NG/ML (ref 30–100)
ALBUMIN SERPL BCP-MCNC: 4 G/DL (ref 3.4–5)
ALP SERPL-CCNC: 69 U/L (ref 33–120)
ALT SERPL W P-5'-P-CCNC: 27 U/L (ref 10–52)
ANION GAP SERPL CALC-SCNC: 8 MMOL/L (ref 10–20)
AST SERPL W P-5'-P-CCNC: 22 U/L (ref 9–39)
BILIRUB SERPL-MCNC: 0.4 MG/DL (ref 0–1.2)
BUN SERPL-MCNC: 12 MG/DL (ref 6–23)
CALCIUM SERPL-MCNC: 8.7 MG/DL (ref 8.6–10.3)
CHLORIDE SERPL-SCNC: 107 MMOL/L (ref 98–107)
CHOLEST SERPL-MCNC: 199 MG/DL (ref 0–199)
CHOLESTEROL/HDL RATIO: 5.7
CO2 SERPL-SCNC: 31 MMOL/L (ref 21–32)
CREAT SERPL-MCNC: 1.02 MG/DL (ref 0.5–1.3)
EGFRCR SERPLBLD CKD-EPI 2021: 90 ML/MIN/1.73M*2
ERYTHROCYTE [DISTWIDTH] IN BLOOD BY AUTOMATED COUNT: 12.2 % (ref 11.5–14.5)
GLUCOSE SERPL-MCNC: 94 MG/DL (ref 74–99)
HCT VFR BLD AUTO: 42 % (ref 41–52)
HDLC SERPL-MCNC: 35 MG/DL
HGB BLD-MCNC: 14.4 G/DL (ref 13.5–17.5)
LDLC SERPL CALC-MCNC: 107 MG/DL
LEVETIRACETAM SERPL-MCNC: 25 UG/ML (ref 10–40)
MCH RBC QN AUTO: 31 PG (ref 26–34)
MCHC RBC AUTO-ENTMCNC: 34.3 G/DL (ref 32–36)
MCV RBC AUTO: 90 FL (ref 80–100)
NON HDL CHOLESTEROL: 164 MG/DL (ref 0–149)
NRBC BLD-RTO: 0 /100 WBCS (ref 0–0)
PLATELET # BLD AUTO: 223 X10*3/UL (ref 150–450)
POTASSIUM SERPL-SCNC: 4.4 MMOL/L (ref 3.5–5.3)
PROT SERPL-MCNC: 6.5 G/DL (ref 6.4–8.2)
RBC # BLD AUTO: 4.65 X10*6/UL (ref 4.5–5.9)
SODIUM SERPL-SCNC: 142 MMOL/L (ref 136–145)
TRIGL SERPL-MCNC: 283 MG/DL (ref 0–149)
VLDL: 57 MG/DL (ref 0–40)
WBC # BLD AUTO: 5 X10*3/UL (ref 4.4–11.3)

## 2024-10-22 PROCEDURE — 80053 COMPREHEN METABOLIC PANEL: CPT

## 2024-10-22 PROCEDURE — 85027 COMPLETE CBC AUTOMATED: CPT

## 2024-10-22 PROCEDURE — 36415 COLL VENOUS BLD VENIPUNCTURE: CPT

## 2024-10-22 PROCEDURE — 80177 DRUG SCRN QUAN LEVETIRACETAM: CPT

## 2024-10-22 PROCEDURE — 80061 LIPID PANEL: CPT

## 2024-10-22 PROCEDURE — 82306 VITAMIN D 25 HYDROXY: CPT

## 2024-10-23 ENCOUNTER — APPOINTMENT (OUTPATIENT)
Dept: PRIMARY CARE | Facility: CLINIC | Age: 49
End: 2024-10-23
Payer: COMMERCIAL

## 2024-10-23 VITALS
BODY MASS INDEX: 28.09 KG/M2 | SYSTOLIC BLOOD PRESSURE: 115 MMHG | TEMPERATURE: 97.6 F | OXYGEN SATURATION: 94 % | HEIGHT: 67 IN | DIASTOLIC BLOOD PRESSURE: 72 MMHG | HEART RATE: 65 BPM | RESPIRATION RATE: 14 BRPM | WEIGHT: 179 LBS

## 2024-10-23 DIAGNOSIS — G40.401 OTHER GENERALIZED EPILEPSY, NOT INTRACTABLE, WITH STATUS EPILEPTICUS: ICD-10-CM

## 2024-10-23 DIAGNOSIS — M62.838 CERVICAL PARASPINAL MUSCLE SPASM: ICD-10-CM

## 2024-10-23 DIAGNOSIS — Z00.00 ANNUAL PHYSICAL EXAM: Primary | ICD-10-CM

## 2024-10-23 DIAGNOSIS — E55.9 VITAMIN D DEFICIENCY: ICD-10-CM

## 2024-10-23 DIAGNOSIS — K21.9 GASTROESOPHAGEAL REFLUX DISEASE, UNSPECIFIED WHETHER ESOPHAGITIS PRESENT: ICD-10-CM

## 2024-10-23 DIAGNOSIS — Z13.29 THYROID DISORDER SCREEN: ICD-10-CM

## 2024-10-23 DIAGNOSIS — E78.1 ESSENTIAL HYPERTRIGLYCERIDEMIA: ICD-10-CM

## 2024-10-23 PROCEDURE — 99396 PREV VISIT EST AGE 40-64: CPT | Performed by: FAMILY MEDICINE

## 2024-10-23 PROCEDURE — 99214 OFFICE O/P EST MOD 30 MIN: CPT | Performed by: FAMILY MEDICINE

## 2024-10-23 PROCEDURE — 1036F TOBACCO NON-USER: CPT | Performed by: FAMILY MEDICINE

## 2024-10-23 PROCEDURE — 3008F BODY MASS INDEX DOCD: CPT | Performed by: FAMILY MEDICINE

## 2024-10-23 RX ORDER — OMEPRAZOLE 40 MG/1
40 CAPSULE, DELAYED RELEASE ORAL DAILY
Qty: 90 CAPSULE | Refills: 1 | Status: SHIPPED | OUTPATIENT
Start: 2024-10-23

## 2024-10-23 RX ORDER — TIZANIDINE 4 MG/1
4 TABLET ORAL NIGHTLY PRN
Qty: 30 TABLET | Refills: 0 | Status: SHIPPED | OUTPATIENT
Start: 2024-10-23 | End: 2024-11-22

## 2024-10-23 RX ORDER — NAPROXEN 500 MG/1
500 TABLET ORAL 2 TIMES DAILY PRN
Qty: 60 TABLET | Refills: 0 | Status: SHIPPED | OUTPATIENT
Start: 2024-10-23 | End: 2025-01-21

## 2024-10-23 ASSESSMENT — ENCOUNTER SYMPTOMS
SHORTNESS OF BREATH: 0
CHILLS: 0
ABDOMINAL PAIN: 0
CONFUSION: 0
CHEST TIGHTNESS: 0
ARTHRALGIAS: 0
FEVER: 0
PALPITATIONS: 0

## 2024-10-23 ASSESSMENT — COLUMBIA-SUICIDE SEVERITY RATING SCALE - C-SSRS
1. IN THE PAST MONTH, HAVE YOU WISHED YOU WERE DEAD OR WISHED YOU COULD GO TO SLEEP AND NOT WAKE UP?: NO
2. HAVE YOU ACTUALLY HAD ANY THOUGHTS OF KILLING YOURSELF?: NO
6. HAVE YOU EVER DONE ANYTHING, STARTED TO DO ANYTHING, OR PREPARED TO DO ANYTHING TO END YOUR LIFE?: NO

## 2024-10-23 ASSESSMENT — PATIENT HEALTH QUESTIONNAIRE - PHQ9
SUM OF ALL RESPONSES TO PHQ9 QUESTIONS 1 AND 2: 0
2. FEELING DOWN, DEPRESSED OR HOPELESS: NOT AT ALL
1. LITTLE INTEREST OR PLEASURE IN DOING THINGS: NOT AT ALL

## 2024-10-23 NOTE — ASSESSMENT & PLAN NOTE
Glycerides have trended upward he mitts he has not been following a proper diet we reviewed low-fat low-cholesterol diet and lifestyle modifications in effort to improve.

## 2024-10-23 NOTE — ASSESSMENT & PLAN NOTE
X-ray cervical spine  Tizanidine 4 mg nightly  Naprosyn 500 mg twice daily with food  Home neck stretches  Topical Biofreeze or IcyHot    return to office 1 month reassessment call if anything worsens in the meantime.

## 2024-10-23 NOTE — PROGRESS NOTES
"Subjective   Patient ID: Lv Cuello is a 49 y.o. male who presents for Annual Exam (5 month).    HPI patient today for follow-up of ongoing healthcare issues and for annual checkup overall says he has been doing good no seizure activity reported per patient.  He has noticed over the past week of muscle spasm left side of his neck tends to go to the back of his head.  Cantley pinpoint a definitive trigger.  No numbness or tingling radiating down his extremities.  No other acute neurological symptoms.    Review of Systems   Constitutional:  Negative for chills and fever.   HENT:  Negative for congestion and ear pain.    Eyes:  Negative for visual disturbance.   Respiratory:  Negative for chest tightness and shortness of breath.    Cardiovascular:  Negative for chest pain and palpitations.   Gastrointestinal:  Negative for abdominal pain.   Musculoskeletal:  Negative for arthralgias.   Skin:  Negative for pallor.   Psychiatric/Behavioral:  Negative for confusion.        Objective   /72   Pulse 65   Temp 36.4 °C (97.6 °F)   Resp 14   Ht 1.702 m (5' 7\")   Wt 81.2 kg (179 lb)   SpO2 94%   BMI 28.04 kg/m²     Physical Exam  Vitals and nursing note reviewed.   Constitutional:       General: He is not in acute distress.     Appearance: Normal appearance. He is not ill-appearing.   HENT:      Head: Normocephalic and atraumatic.      Right Ear: Tympanic membrane, ear canal and external ear normal.      Left Ear: Tympanic membrane, ear canal and external ear normal.      Mouth/Throat:      Pharynx: Oropharynx is clear.   Eyes:      Extraocular Movements: Extraocular movements intact.   Cardiovascular:      Rate and Rhythm: Normal rate and regular rhythm.      Pulses: Normal pulses.      Heart sounds: Normal heart sounds.   Pulmonary:      Effort: Pulmonary effort is normal.      Breath sounds: Normal breath sounds.   Abdominal:      General: Abdomen is flat. Bowel sounds are normal.      Palpations: Abdomen " is soft.      Tenderness: There is no abdominal tenderness.   Musculoskeletal:         General: Normal range of motion.      Cervical back: Neck supple.   Skin:     General: Skin is warm.   Neurological:      Mental Status: He is alert and oriented to person, place, and time. Mental status is at baseline.   Psychiatric:         Mood and Affect: Mood normal.       Recent Results (from the past 6 weeks)   CBC    Collection Time: 10/22/24 11:22 AM   Result Value Ref Range    WBC 5.0 4.4 - 11.3 x10*3/uL    nRBC 0.0 0.0 - 0.0 /100 WBCs    RBC 4.65 4.50 - 5.90 x10*6/uL    Hemoglobin 14.4 13.5 - 17.5 g/dL    Hematocrit 42.0 41.0 - 52.0 %    MCV 90 80 - 100 fL    MCH 31.0 26.0 - 34.0 pg    MCHC 34.3 32.0 - 36.0 g/dL    RDW 12.2 11.5 - 14.5 %    Platelets 223 150 - 450 x10*3/uL   Comprehensive Metabolic Panel    Collection Time: 10/22/24 11:22 AM   Result Value Ref Range    Glucose 94 74 - 99 mg/dL    Sodium 142 136 - 145 mmol/L    Potassium 4.4 3.5 - 5.3 mmol/L    Chloride 107 98 - 107 mmol/L    Bicarbonate 31 21 - 32 mmol/L    Anion Gap 8 (L) 10 - 20 mmol/L    Urea Nitrogen 12 6 - 23 mg/dL    Creatinine 1.02 0.50 - 1.30 mg/dL    eGFR 90 >60 mL/min/1.73m*2    Calcium 8.7 8.6 - 10.3 mg/dL    Albumin 4.0 3.4 - 5.0 g/dL    Alkaline Phosphatase 69 33 - 120 U/L    Total Protein 6.5 6.4 - 8.2 g/dL    AST 22 9 - 39 U/L    Bilirubin, Total 0.4 0.0 - 1.2 mg/dL    ALT 27 10 - 52 U/L   Lipid Panel    Collection Time: 10/22/24 11:22 AM   Result Value Ref Range    Cholesterol 199 0 - 199 mg/dL    HDL-Cholesterol 35.0 mg/dL    Cholesterol/HDL Ratio 5.7     LDL Calculated 107 (H) <=99 mg/dL    VLDL 57 (H) 0 - 40 mg/dL    Triglycerides 283 (H) 0 - 149 mg/dL    Non HDL Cholesterol 164 (H) 0 - 149 mg/dL   Vitamin D 25-Hydroxy,Total (for eval of Vitamin D levels)    Collection Time: 10/22/24 11:22 AM   Result Value Ref Range    Vitamin D, 25-Hydroxy, Total 37 30 - 100 ng/mL   Levetiracetam    Collection Time: 10/22/24 11:22 AM   Result Value  Ref Range    Keppra 25 10 - 40 ug/mL     Recent labs reviewed with patient triglycerides have drifted upward we reviewed low-fat low-cholesterol diet exercise weight loss strategies.    Patient to schedule follow-up appoint with his neurologist for an update on his epilepsy.    X-ray of cervical  Tizanidine 4 mg nightly  Naprosyn 500 mg twice daily  Biofreeze or IcyHot  Home neck stretches  return in 4 weeks to reassess    Return in 6 months with repeat fasting labs      Assessment/Plan   Problem List Items Addressed This Visit             ICD-10-CM    GERD (gastroesophageal reflux disease) K21.9     Stable continue omeprazole 40 mg as directed         Relevant Medications    omeprazole (PriLOSEC) 40 mg DR capsule    Other Relevant Orders    Comprehensive Metabolic Panel    Follow Up In Primary Care - Established    Epilepsy G40.909     No obvious seizure activity reported by patient for now he will continue his Keppra.  Encouraged him to make a follow-up appointment with his neurologist just to touch base and discuss any questions he may have about long-term care regarding this condition.         Relevant Orders    CBC    Comprehensive Metabolic Panel    Levetiracetam    Follow Up In Primary Care - Established    Essential hypertriglyceridemia E78.1     Glycerides have trended upward he mitts he has not been following a proper diet we reviewed low-fat low-cholesterol diet and lifestyle modifications in effort to improve.         Relevant Orders    CBC    Comprehensive Metabolic Panel    Lipid Panel    Follow Up In Primary Care - Established    Vitamin D deficiency E55.9     Continue to monitor supplement as needed         Relevant Medications    tiZANidine (Zanaflex) 4 mg tablet    naproxen (Naprosyn) 500 mg tablet    Other Relevant Orders    Vitamin D 25-Hydroxy,Total (for eval of Vitamin D levels)    Thyroid disorder screen Z13.29     TSH with reflex         Relevant Orders    TSH with reflex to Free T4 if  abnormal    Cervical paraspinal muscle spasm M62.838     X-ray cervical spine  Tizanidine 4 mg nightly  Naprosyn 500 mg twice daily with food  Home neck stretches  Topical Biofreeze or IcyHot    return to office 1 month reassessment call if anything worsens in the meantime.         Relevant Orders    XR cervical spine 2-3 views    Follow Up In Primary Care - Established    Annual physical exam - Primary Z00.00     Recent labs reviewed  Low-fat low-cholesterol diet    He refuses flu vaccine

## 2024-10-23 NOTE — ASSESSMENT & PLAN NOTE
No obvious seizure activity reported by patient for now he will continue his Keppra.  Encouraged him to make a follow-up appointment with his neurologist just to touch base and discuss any questions he may have about long-term care regarding this condition.   negative...

## 2024-11-25 ENCOUNTER — APPOINTMENT (OUTPATIENT)
Dept: PRIMARY CARE | Facility: CLINIC | Age: 49
End: 2024-11-25
Payer: COMMERCIAL

## 2024-12-10 DIAGNOSIS — R56.9 SEIZURE (MULTI): ICD-10-CM

## 2024-12-10 RX ORDER — LACOSAMIDE 100 MG/1
100 TABLET ORAL 2 TIMES DAILY
Qty: 60 TABLET | Refills: 5 | Status: SHIPPED | OUTPATIENT
Start: 2024-12-10

## 2024-12-24 DIAGNOSIS — R56.9 SEIZURE (MULTI): ICD-10-CM

## 2024-12-26 RX ORDER — LEVETIRACETAM 500 MG/1
250 TABLET ORAL DAILY
Qty: 45 TABLET | Refills: 3 | Status: SHIPPED | OUTPATIENT
Start: 2024-12-26

## 2025-02-08 DIAGNOSIS — R56.9 SEIZURE (MULTI): ICD-10-CM

## 2025-02-11 RX ORDER — LEVETIRACETAM 1000 MG/1
1000 TABLET ORAL 2 TIMES DAILY
Qty: 180 TABLET | Refills: 3 | Status: SHIPPED | OUTPATIENT
Start: 2025-02-11

## 2025-03-05 ENCOUNTER — OFFICE VISIT (OUTPATIENT)
Dept: URGENT CARE | Age: 50
End: 2025-03-05
Payer: COMMERCIAL

## 2025-03-05 VITALS
DIASTOLIC BLOOD PRESSURE: 71 MMHG | SYSTOLIC BLOOD PRESSURE: 105 MMHG | OXYGEN SATURATION: 96 % | RESPIRATION RATE: 18 BRPM | TEMPERATURE: 98.6 F | HEART RATE: 84 BPM

## 2025-03-05 DIAGNOSIS — J10.1 INFLUENZA B: Primary | ICD-10-CM

## 2025-03-05 DIAGNOSIS — R68.89 FLU-LIKE SYMPTOMS: ICD-10-CM

## 2025-03-05 LAB
POC RAPID INFLUENZA A: NEGATIVE
POC RAPID INFLUENZA B: POSITIVE
POC SARS-COV-2 AG BINAX: NORMAL

## 2025-03-05 PROCEDURE — 87811 SARS-COV-2 COVID19 W/OPTIC: CPT

## 2025-03-05 PROCEDURE — 99203 OFFICE O/P NEW LOW 30 MIN: CPT

## 2025-03-05 PROCEDURE — 87804 INFLUENZA ASSAY W/OPTIC: CPT

## 2025-03-05 RX ORDER — OSELTAMIVIR PHOSPHATE 75 MG/1
75 CAPSULE ORAL EVERY 12 HOURS
Qty: 10 CAPSULE | Refills: 0 | Status: SHIPPED | OUTPATIENT
Start: 2025-03-05 | End: 2025-03-10

## 2025-03-05 ASSESSMENT — ENCOUNTER SYMPTOMS
SHORTNESS OF BREATH: 0
SORE THROAT: 0
CONSTIPATION: 0
MYALGIAS: 1
CHILLS: 1
FLU SYMPTOMS: 1
WHEEZING: 0
DIARRHEA: 0
FEVER: 1
CHEST TIGHTNESS: 0
COUGH: 1
RHINORRHEA: 1
TROUBLE SWALLOWING: 0
NAUSEA: 0
PALPITATIONS: 0

## 2025-03-05 NOTE — PATIENT INSTRUCTIONS
Positive influenza B testing in clinic.     Recommended Salt water gargles, hot tea and honey, tylenol/ibuprofen, nasal sprays, steam inhalation    Over the counter decongestants and cough suppressants - mucinex recommended every 12 hours.     Alternate tylenol/ibuprofen every 3 hours  Tylenol: 500-1000 mg every 6 hours (do not exceed 4000 mg in 24 hour period)  Ibuprofen 400-600 mg every 6 hours.     If you develop chest pain, shortness of breath, fever not reduced with tylenol/ibuprofen go to ER.     Follow up with pcp.

## 2025-03-05 NOTE — PROGRESS NOTES
"Subjective   Patient ID: Lv Cuello \"Hannah" is a 49 y.o. male. They present today with a chief complaint of Flu Symptoms (Pt advised that he has had a cough with some productive Phlegm, fever, chills, and myalgia for the past 1 day. ).    History of Present Illness  Patient presents with one day of productive cough, fever, chills, sweats, body ache, congestion, headache. Denies n/v/d. Denies chest pain, sob.       Flu Symptoms  Presenting symptoms: cough, fever, myalgias and rhinorrhea    Presenting symptoms: no diarrhea, no nausea, no shortness of breath and no sore throat    Associated symptoms: chills and nasal congestion    Associated symptoms: no ear pain        Past Medical History  Allergies as of 03/05/2025    (No Known Allergies)       (Not in a hospital admission)       Past Medical History:   Diagnosis Date    Dizziness and giddiness 01/17/2021    Lightheadedness    Epilepsy, unspecified, not intractable, without status epilepticus 09/19/2022    Epilepsy    Irritability and anger 11/05/2020    Irritability    Other long term (current) drug therapy 03/22/2022    On antiepileptic therapy    Other symptoms and signs involving emotional state 11/27/2019    Depressed mood    Unspecified disorder of eye and adnexa 10/16/2019    Eye problem       Past Surgical History:   Procedure Laterality Date    OTHER SURGICAL HISTORY  11/08/2019    Knee surgery        reports that he quit smoking about 11 years ago. His smoking use included cigarettes. He has been exposed to tobacco smoke. He has never used smokeless tobacco. He reports current alcohol use of about 3.0 standard drinks of alcohol per week. He reports that he does not use drugs.    Review of Systems  Review of Systems   Constitutional:  Positive for chills and fever.   HENT:  Positive for congestion, postnasal drip and rhinorrhea. Negative for ear pain, sore throat and trouble swallowing.    Respiratory:  Positive for cough. Negative for chest " tightness, shortness of breath and wheezing.    Cardiovascular:  Negative for chest pain and palpitations.   Gastrointestinal:  Negative for constipation, diarrhea and nausea.   Musculoskeletal:  Positive for myalgias.   Skin:  Negative for rash.                                  Objective    Vitals:    03/05/25 1413   BP: 105/71   Pulse: 84   Resp: 18   Temp: 37 °C (98.6 °F)   SpO2: 96%     No LMP for male patient.    Physical Exam  Constitutional:       General: He is not in acute distress.     Appearance: He is not toxic-appearing.   HENT:      Right Ear: Tympanic membrane and external ear normal.      Left Ear: Tympanic membrane and external ear normal.      Nose: Congestion present.      Right Sinus: No frontal sinus tenderness.      Left Sinus: No frontal sinus tenderness.      Mouth/Throat:      Mouth: Mucous membranes are moist. No oral lesions.      Pharynx: Postnasal drip present. No oropharyngeal exudate or posterior oropharyngeal erythema.   Eyes:      Pupils: Pupils are equal, round, and reactive to light.   Cardiovascular:      Rate and Rhythm: Normal rate and regular rhythm.   Pulmonary:      Effort: Pulmonary effort is normal. No respiratory distress.      Breath sounds: Normal breath sounds. No wheezing.   Musculoskeletal:      Cervical back: Normal range of motion.   Neurological:      Mental Status: He is alert.         Procedures    Point of Care Test & Imaging Results from this visit  Results for orders placed or performed in visit on 03/05/25   POCT Covid-19 Rapid Antigen   Result Value Ref Range    POC VALE-COV-2 AG  Presumptive negative test for SARS-CoV-2 (no antigen detected)     Presumptive negative test for SARS-CoV-2 (no antigen detected)   POCT Influenza A/B manually resulted   Result Value Ref Range    POC Rapid Influenza A Negative Negative    POC Rapid Influenza B Positive (A) Negative      No results found.    Diagnostic study results (if any) were reviewed by Karely Rosario  [place].    Assessment/Plan   Allergies, medications, history, and pertinent labs/EKGs/Imaging reviewed by Karely Rosario PA-C.     Medical Decision Making  MDM- Signs, symptoms, history, and examination consistent with influenza. No evidence of strep pharyngitis, sinusitis, AOM, sepsis, pneumonia, or other respiratory complications. Advise supportive measures. Tamiflu is indicated and the medicine sent. Side effects and improvement with Tamiflu explained. Patient is advised to return to clinic or present to ED if symptoms change or worsen. Otherwise follow with PCP. Patient verbalized understanding and agrees with plan.       Orders and Diagnoses  Diagnoses and all orders for this visit:  Influenza B  -     oseltamivir (Tamiflu) 75 mg capsule; Take 1 capsule (75 mg) by mouth every 12 hours for 5 days.  Flu-like symptoms  -     POCT Covid-19 Rapid Antigen  -     POCT Influenza A/B manually resulted      Medical Admin Record      Patient disposition: Home    Electronically signed by Karely Rosario PA-C  2:28 PM

## 2025-04-24 ENCOUNTER — APPOINTMENT (OUTPATIENT)
Dept: PRIMARY CARE | Facility: CLINIC | Age: 50
End: 2025-04-24
Payer: COMMERCIAL

## 2025-04-24 VITALS
SYSTOLIC BLOOD PRESSURE: 103 MMHG | DIASTOLIC BLOOD PRESSURE: 71 MMHG | TEMPERATURE: 97.4 F | BODY MASS INDEX: 26.94 KG/M2 | HEART RATE: 71 BPM | OXYGEN SATURATION: 95 % | WEIGHT: 172 LBS | RESPIRATION RATE: 14 BRPM

## 2025-04-24 DIAGNOSIS — E55.9 VITAMIN D DEFICIENCY: ICD-10-CM

## 2025-04-24 DIAGNOSIS — Z00.00 ANNUAL PHYSICAL EXAM: ICD-10-CM

## 2025-04-24 DIAGNOSIS — Z12.5 SCREENING FOR PROSTATE CANCER: ICD-10-CM

## 2025-04-24 DIAGNOSIS — K21.9 GASTROESOPHAGEAL REFLUX DISEASE, UNSPECIFIED WHETHER ESOPHAGITIS PRESENT: ICD-10-CM

## 2025-04-24 DIAGNOSIS — R53.83 OTHER FATIGUE: ICD-10-CM

## 2025-04-24 DIAGNOSIS — G40.401 OTHER GENERALIZED EPILEPSY, NOT INTRACTABLE, WITH STATUS EPILEPTICUS: ICD-10-CM

## 2025-04-24 DIAGNOSIS — E78.1 ESSENTIAL HYPERTRIGLYCERIDEMIA: Primary | ICD-10-CM

## 2025-04-24 PROBLEM — Z13.29 THYROID DISORDER SCREEN: Status: RESOLVED | Noted: 2023-12-19 | Resolved: 2025-04-24

## 2025-04-24 PROCEDURE — 1036F TOBACCO NON-USER: CPT | Performed by: FAMILY MEDICINE

## 2025-04-24 PROCEDURE — 99214 OFFICE O/P EST MOD 30 MIN: CPT | Performed by: FAMILY MEDICINE

## 2025-04-24 RX ORDER — OMEPRAZOLE 40 MG/1
40 CAPSULE, DELAYED RELEASE ORAL DAILY
Qty: 90 CAPSULE | Refills: 1 | Status: SHIPPED | OUTPATIENT
Start: 2025-04-24

## 2025-04-24 ASSESSMENT — ENCOUNTER SYMPTOMS
CHEST TIGHTNESS: 0
SHORTNESS OF BREATH: 0
ABDOMINAL PAIN: 0
ARTHRALGIAS: 0
PALPITATIONS: 0
FEVER: 0
CHILLS: 0
CONFUSION: 0

## 2025-04-24 ASSESSMENT — PATIENT HEALTH QUESTIONNAIRE - PHQ9
2. FEELING DOWN, DEPRESSED OR HOPELESS: NOT AT ALL
1. LITTLE INTEREST OR PLEASURE IN DOING THINGS: NOT AT ALL
SUM OF ALL RESPONSES TO PHQ9 QUESTIONS 1 AND 2: 0

## 2025-04-24 NOTE — ASSESSMENT & PLAN NOTE
Nice improvement in triglycerides patient to continue lifestyle modifications dietary modifications

## 2025-04-24 NOTE — PROGRESS NOTES
Subjective   Patient ID: Yogesh Cuello is a 49 y.o. male who presents for Follow-up (6 month).    HPI patient today for follow-up of ongoing healthcare issues and review of recent lab work overalls been doing good denies any known seizure activities been modifying diet and lifestyle in order to improve triglycerides.    Review of Systems   Constitutional:  Negative for chills and fever.   HENT:  Negative for congestion and ear pain.    Eyes:  Negative for visual disturbance.   Respiratory:  Negative for chest tightness and shortness of breath.    Cardiovascular:  Negative for chest pain and palpitations.   Gastrointestinal:  Negative for abdominal pain.   Musculoskeletal:  Negative for arthralgias.   Skin:  Negative for pallor.   Psychiatric/Behavioral:  Negative for confusion.        Objective   /71   Pulse 71   Temp 36.3 °C (97.4 °F)   Resp 14   Wt 78 kg (172 lb)   SpO2 95%   BMI 26.94 kg/m²     Physical Exam  Vitals and nursing note reviewed.   Constitutional:       General: He is not in acute distress.     Appearance: Normal appearance. He is not ill-appearing.   HENT:      Head: Normocephalic and atraumatic.      Right Ear: Tympanic membrane, ear canal and external ear normal.      Left Ear: Tympanic membrane, ear canal and external ear normal.      Mouth/Throat:      Pharynx: Oropharynx is clear.   Eyes:      Extraocular Movements: Extraocular movements intact.   Cardiovascular:      Rate and Rhythm: Normal rate and regular rhythm.      Pulses: Normal pulses.      Heart sounds: Normal heart sounds.   Pulmonary:      Effort: Pulmonary effort is normal.      Breath sounds: Normal breath sounds.   Abdominal:      General: Abdomen is flat. Bowel sounds are normal.      Palpations: Abdomen is soft.      Tenderness: There is no abdominal tenderness.   Musculoskeletal:         General: Normal range of motion.      Cervical back: Neck supple.   Skin:     General: Skin is warm.   Neurological:       Mental Status: He is alert and oriented to person, place, and time. Mental status is at baseline.   Psychiatric:         Mood and Affect: Mood normal.       Recent Results (from the past 6 weeks)   Lipid Panel    Collection Time: 04/23/25 11:59 AM   Result Value Ref Range    CHOLESTEROL, TOTAL 192 <200 mg/dL    HDL CHOLESTEROL 46 > OR = 40 mg/dL    TRIGLYCERIDES 137 <150 mg/dL    LDL-CHOLESTEROL 121 (H) mg/dL (calc)    CHOL/HDLC RATIO 4.2 <5.0 (calc)    NON HDL CHOLESTEROL 146 (H) <130 mg/dL (calc)   Comprehensive Metabolic Panel    Collection Time: 04/23/25 11:59 AM   Result Value Ref Range    GLUCOSE 92 65 - 99 mg/dL    UREA NITROGEN (BUN) 12 7 - 25 mg/dL    CREATININE 1.20 0.60 - 1.29 mg/dL    EGFR 74 > OR = 60 mL/min/1.73m2    SODIUM 143 135 - 146 mmol/L    POTASSIUM 4.5 3.5 - 5.3 mmol/L    CHLORIDE 106 98 - 110 mmol/L    CARBON DIOXIDE 28 20 - 32 mmol/L    ELECTROLYTE BALANCE 9 7 - 17 mmol/L (calc)    CALCIUM 9.3 8.6 - 10.3 mg/dL    PROTEIN, TOTAL 7.3 6.1 - 8.1 g/dL    ALBUMIN 4.4 3.6 - 5.1 g/dL    BILIRUBIN, TOTAL 0.6 0.2 - 1.2 mg/dL    ALKALINE PHOSPHATASE 72 36 - 130 U/L    AST 36 10 - 40 U/L    ALT 39 9 - 46 U/L   CBC    Collection Time: 04/23/25 11:59 AM   Result Value Ref Range    WHITE BLOOD CELL COUNT 4.9 3.8 - 10.8 Thousand/uL    RED BLOOD CELL COUNT 4.76 4.20 - 5.80 Million/uL    HEMOGLOBIN 14.7 13.2 - 17.1 g/dL    HEMATOCRIT 44.2 38.5 - 50.0 %    MCV 92.9 80.0 - 100.0 fL    MCH 30.9 27.0 - 33.0 pg    MCHC 33.3 32.0 - 36.0 g/dL    RDW 13.1 11.0 - 15.0 %    PLATELET COUNT 224 140 - 400 Thousand/uL    MPV 10.2 7.5 - 12.5 fL   TSH with reflex to Free T4 if abnormal    Collection Time: 04/23/25 11:59 AM   Result Value Ref Range    TSH W/REFLEX TO FT4 2.86 0.40 - 4.50 mIU/L   Vitamin D 25-Hydroxy,Total (for eval of Vitamin D levels)    Collection Time: 04/23/25 11:59 AM   Result Value Ref Range    VITAMIN D,25-OH,TOTAL,IA 55 30 - 100 ng/mL   Levetiracetam    Collection Time: 04/23/25 11:59 AM   Result  Value Ref Range    LEVETIRACETAM       Recent labs reviewed overall numbers are stable for patient nice improvement in triglycerides he is to continue healthy diet and lifestyle habits along with current medication.  Return to office in 6 months with repeat fasting labs including a PSA and we will discuss at that time screening colonoscopy as he will be turning 50 this October      Assessment/Plan   Problem List Items Addressed This Visit           ICD-10-CM    GERD (gastroesophageal reflux disease) K21.9    Stable continue omeprazole 40 mg daily as needed         Relevant Medications    omeprazole (PriLOSEC) 40 mg DR capsule    Other Relevant Orders    Follow Up In Primary Care - Established    Epilepsy G40.909    Medically stable no obvious seizure activity reported continue antiseizure medications as per neuro.         Relevant Orders    CBC    Comprehensive Metabolic Panel    Levetiracetam    Essential hypertriglyceridemia - Primary E78.1    Nice improvement in triglycerides patient to continue lifestyle modifications dietary modifications         Relevant Orders    Comprehensive Metabolic Panel    Lipid Panel    Follow Up In Primary Care - Established    Vitamin D deficiency E55.9    Continue to monitor supplement as needed         Relevant Orders    Vitamin D 25-Hydroxy,Total (for eval of Vitamin D levels)    Follow Up In Primary Care - Established    Annual physical exam Z00.00    Relevant Orders    CBC    Comprehensive Metabolic Panel    Screening for prostate cancer Z12.5    Screening PSA         Relevant Orders    Prostate Specific Antigen, Screen    Other fatigue R53.83    TSH with reflex         Relevant Orders    TSH with reflex to Free T4 if abnormal

## 2025-04-24 NOTE — ASSESSMENT & PLAN NOTE
Medically stable no obvious seizure activity reported continue antiseizure medications as per neuro.

## 2025-04-26 LAB
25(OH)D3+25(OH)D2 SERPL-MCNC: 55 NG/ML (ref 30–100)
ALBUMIN SERPL-MCNC: 4.4 G/DL (ref 3.6–5.1)
ALP SERPL-CCNC: 72 U/L (ref 36–130)
ALT SERPL-CCNC: 39 U/L (ref 9–46)
ANION GAP SERPL CALCULATED.4IONS-SCNC: 9 MMOL/L (CALC) (ref 7–17)
AST SERPL-CCNC: 36 U/L (ref 10–40)
BILIRUB SERPL-MCNC: 0.6 MG/DL (ref 0.2–1.2)
BUN SERPL-MCNC: 12 MG/DL (ref 7–25)
CALCIUM SERPL-MCNC: 9.3 MG/DL (ref 8.6–10.3)
CHLORIDE SERPL-SCNC: 106 MMOL/L (ref 98–110)
CHOLEST SERPL-MCNC: 192 MG/DL
CHOLEST/HDLC SERPL: 4.2 (CALC)
CO2 SERPL-SCNC: 28 MMOL/L (ref 20–32)
CREAT SERPL-MCNC: 1.2 MG/DL (ref 0.6–1.29)
EGFRCR SERPLBLD CKD-EPI 2021: 74 ML/MIN/1.73M2
ERYTHROCYTE [DISTWIDTH] IN BLOOD BY AUTOMATED COUNT: 13.1 % (ref 11–15)
GLUCOSE SERPL-MCNC: 92 MG/DL (ref 65–99)
HCT VFR BLD AUTO: 44.2 % (ref 38.5–50)
HDLC SERPL-MCNC: 46 MG/DL
HGB BLD-MCNC: 14.7 G/DL (ref 13.2–17.1)
LDLC SERPL CALC-MCNC: 121 MG/DL (CALC)
LEVETIRACETAM SERPL-MCNC: 19.5 MCG/ML
MCH RBC QN AUTO: 30.9 PG (ref 27–33)
MCHC RBC AUTO-ENTMCNC: 33.3 G/DL (ref 32–36)
MCV RBC AUTO: 92.9 FL (ref 80–100)
NONHDLC SERPL-MCNC: 146 MG/DL (CALC)
PLATELET # BLD AUTO: 224 THOUSAND/UL (ref 140–400)
PMV BLD REES-ECKER: 10.2 FL (ref 7.5–12.5)
POTASSIUM SERPL-SCNC: 4.5 MMOL/L (ref 3.5–5.3)
PROT SERPL-MCNC: 7.3 G/DL (ref 6.1–8.1)
RBC # BLD AUTO: 4.76 MILLION/UL (ref 4.2–5.8)
SODIUM SERPL-SCNC: 143 MMOL/L (ref 135–146)
TRIGL SERPL-MCNC: 137 MG/DL
TSH SERPL-ACNC: 2.86 MIU/L (ref 0.4–4.5)
WBC # BLD AUTO: 4.9 THOUSAND/UL (ref 3.8–10.8)

## 2025-05-21 NOTE — ASSESSMENT & PLAN NOTE
Lab Results   Component Value Date    HGBA1C 6.2 (H) 05/20/2025       Recent Labs     05/20/25  1639 05/20/25  2034 05/21/25  0801 05/21/25  1127   POCGLU 214* 166* 83 196*       Blood Sugar Average: Last 72 hrs:  (P) 148.375  Update hemoglobin A1c  Holding PTA glipizide  Patient takes Lantus 40 units at bedtime, will decrease dose to 20 units at bedtime  SSI with Accu-Cheks  Carb controlled diet  Hypoglycemia protocol   Recent labs reviewed  Low-fat low-cholesterol diet    He refuses flu vaccine

## 2025-06-09 DIAGNOSIS — R56.9 SEIZURE (MULTI): ICD-10-CM

## 2025-06-10 RX ORDER — LACOSAMIDE 100 MG/1
100 TABLET ORAL 2 TIMES DAILY
Qty: 60 TABLET | Refills: 5 | Status: SHIPPED | OUTPATIENT
Start: 2025-06-10

## 2025-06-11 ENCOUNTER — OFFICE VISIT (OUTPATIENT)
Dept: URGENT CARE | Age: 50
End: 2025-06-11
Payer: COMMERCIAL

## 2025-06-11 VITALS
SYSTOLIC BLOOD PRESSURE: 121 MMHG | DIASTOLIC BLOOD PRESSURE: 80 MMHG | RESPIRATION RATE: 16 BRPM | TEMPERATURE: 98.1 F | HEART RATE: 63 BPM | OXYGEN SATURATION: 99 %

## 2025-06-11 DIAGNOSIS — H10.31 ACUTE BACTERIAL CONJUNCTIVITIS OF RIGHT EYE: Primary | ICD-10-CM

## 2025-06-11 PROCEDURE — 99213 OFFICE O/P EST LOW 20 MIN: CPT

## 2025-06-11 RX ORDER — OFLOXACIN 3 MG/ML
1 SOLUTION/ DROPS OPHTHALMIC 4 TIMES DAILY
Qty: 5 ML | Refills: 0 | Status: SHIPPED | OUTPATIENT
Start: 2025-06-11 | End: 2025-06-18

## 2025-06-11 ASSESSMENT — ENCOUNTER SYMPTOMS
EYE REDNESS: 1
DIARRHEA: 0
EYE DISCHARGE: 1
EYE ITCHING: 1
RHINORRHEA: 0
CHILLS: 0
SORE THROAT: 0
CONSTIPATION: 0
CHEST TIGHTNESS: 0
PHOTOPHOBIA: 0
EYE PAIN: 0
NAUSEA: 0
TROUBLE SWALLOWING: 0
COUGH: 0
WHEEZING: 0
FEVER: 0
SHORTNESS OF BREATH: 0

## 2025-06-11 NOTE — PROGRESS NOTES
"Subjective   Patient ID: Lv Cuello \"Hannah" is a 49 y.o. male. They present today with a chief complaint of Eye Problem (Swelling under rt eye for 1 day. Tender and itching.).    History of Present Illness  Patient presents for right eye swelling, irritation, discharge for one day. Denies uri symptoms including cough, congestion. Denies eye trauma, photosensitivity or fb sensation. Denies blurred vision, double vision.   Denies fever, chills, sweats. Denies n/v/d. Denies chest pain, sob.         Eye Problem  Associated symptoms: discharge, itching and redness    Associated symptoms: no nausea and no photophobia        Past Medical History  Allergies as of 06/11/2025    (No Known Allergies)       Prescriptions Prior to Admission[1]     Medical History[2]    Surgical History[3]     reports that he quit smoking about 11 years ago. His smoking use included cigarettes. He has been exposed to tobacco smoke. He has never used smokeless tobacco. He reports current alcohol use of about 3.0 standard drinks of alcohol per week. He reports that he does not use drugs.    Review of Systems  Review of Systems   Constitutional:  Negative for chills and fever.   HENT:  Negative for congestion, ear pain, postnasal drip, rhinorrhea, sore throat and trouble swallowing.    Eyes:  Positive for discharge, redness and itching. Negative for photophobia, pain and visual disturbance.   Respiratory:  Negative for cough, chest tightness, shortness of breath and wheezing.    Cardiovascular:  Negative for chest pain.   Gastrointestinal:  Negative for constipation, diarrhea and nausea.   Skin:  Negative for rash.                                  Objective    Vitals:    06/11/25 1646   BP: 121/80   Pulse: 63   Resp: 16   Temp: 36.7 °C (98.1 °F)   SpO2: 99%     No LMP for male patient.    Physical Exam  Constitutional:       General: He is not in acute distress.     Appearance: Normal appearance. He is not ill-appearing.   HENT:      Head: " Normocephalic and atraumatic.      Nose: Nose normal.   Eyes:      General: Lids are normal.         Right eye: Discharge present.      Extraocular Movements: Extraocular movements intact.      Conjunctiva/sclera:      Right eye: Right conjunctiva is injected.      Pupils: Pupils are equal, round, and reactive to light.   Pulmonary:      Effort: Pulmonary effort is normal.      Breath sounds: Normal breath sounds.   Musculoskeletal:      Cervical back: Normal range of motion.   Neurological:      Mental Status: He is alert.         Procedures    Point of Care Test & Imaging Results from this visit  No results found for this visit on 06/11/25.   Imaging  No results found.    Cardiology, Vascular, and Other Imaging  No other imaging results found for the past 2 days      Diagnostic study results (if any) were reviewed by Karely Rosario PA-C.    Assessment/Plan   Allergies, medications, history, and pertinent labs/EKGs/Imaging reviewed by Karely Rosario PA-C.     Medical Decision Making  MDM- Signs and symptoms consistent with bacterial conjunctivitis. There is no clinical evidence to suggest keratitis, iritis, uveitis, corneal abrasion, glaucoma, etc. Plan is for topical antibiotic therapy. Follow with PCP and return to clinic if symptoms change or worsen. No contact use for a week.  Patient verbalized understanding and agrees with plan.       Orders and Diagnoses  Diagnoses and all orders for this visit:  Acute bacterial conjunctivitis of right eye  -     ofloxacin (Ocuflox) 0.3 % ophthalmic solution; Administer 1 drop into the right eye 4 times a day for 7 days.      Medical Admin Record      Patient disposition: Home    Electronically signed by Karely Rosario PA-C  5:16 PM           [1] (Not in a hospital admission)   [2]   Past Medical History:  Diagnosis Date    Dizziness and giddiness 01/17/2021    Lightheadedness    Epilepsy, unspecified, not intractable, without status epilepticus 09/19/2022     Epilepsy    Irritability and anger 11/05/2020    Irritability    Other long term (current) drug therapy 03/22/2022    On antiepileptic therapy    Other symptoms and signs involving emotional state 11/27/2019    Depressed mood    Unspecified disorder of eye and adnexa 10/16/2019    Eye problem   [3]   Past Surgical History:  Procedure Laterality Date    OTHER SURGICAL HISTORY  11/08/2019    Knee surgery

## 2025-06-11 NOTE — PATIENT INSTRUCTIONS
Take antibiotic drops 4 times a day for 7 days.     Considered contagious up to 24 hours on abx.     Frequent hand washing recommended, avoid touching eye    Follow up with pcp or ophthalmologist    Er if any blurred vision or extreme eye pain.

## 2025-10-29 ENCOUNTER — APPOINTMENT (OUTPATIENT)
Dept: PRIMARY CARE | Facility: CLINIC | Age: 50
End: 2025-10-29
Payer: COMMERCIAL